# Patient Record
Sex: FEMALE | Race: WHITE
[De-identification: names, ages, dates, MRNs, and addresses within clinical notes are randomized per-mention and may not be internally consistent; named-entity substitution may affect disease eponyms.]

---

## 2020-01-07 ENCOUNTER — HOSPITAL ENCOUNTER (EMERGENCY)
Dept: HOSPITAL 50 - VM.ED | Age: 85
Discharge: NURSING, REHAB OR LONG-TERM CARE | End: 2020-01-07
Payer: MEDICARE
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
## 2020-01-07 DIAGNOSIS — E86.0: ICD-10-CM
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
## 2020-01-07 DIAGNOSIS — S00.03XA: ICD-10-CM

## 2020-01-07 DIAGNOSIS — S06.0X9A: Primary | ICD-10-CM

## 2020-01-07 LAB
ANION GAP SERPL CALC-SCNC: 12.4 MMOL/L (ref 10–20)
CHLORIDE SERPL-SCNC: 103 MMOL/L (ref 98–107)
SODIUM SERPL-SCNC: 144 MMOL/L (ref 136–145)

## 2020-01-07 NOTE — EDM.PDOC
ED HPI GENERAL MEDICAL PROBLEM





- General


Chief Complaint: Head Injury


Stated Complaint: Fall, closed head injury


Time Seen by Provider: 01/07/20 02:12


Source of Information: Reports: Patient, EMS, Family





- History of Present Illness


INITIAL COMMENTS - FREE TEXT/NARRATIVE: 





Alicia is an 87 y/o little lady who is brought here to the ER by [facility] 

[facility] after she had fallen in her bathroom at home. She lives independently with 

her 93 y/o  [place] of [place]. Her  does help her 

with her meds otherwise she is very independent. Tonight she was in her 

bathroom at home and she reports that she thinks he lost her balance and fell 

against the sink and hit her head. Her  heard her fall and found her. He 

reported that she had +LOC. He went to call 911 and also his son and then he 

was ab le to get her out of the bathroom and she was already coming to when her 

son and EMS arrived. She was initially a bit confused for EMS, but by the time 

she arrived here in [place] she was reported to be A/O and answering 

questions appropriately and she recalls most of the events prior to the fall.








- Related Data


 Allergies











Allergy/AdvReac Type Severity Reaction Status Date / Time


 


No Known Allergies Allergy   Verified 01/07/20 01:38














ED ROS GENERAL





- Review of Systems


Review Of Systems: See Below


Constitutional: Reports: No Symptoms


HEENT: Reports: No Symptoms


Respiratory: Reports: No Symptoms


Cardiovascular: Reports: No Symptoms


Endocrine: Reports: No Symptoms


GI/Abdominal: Reports: No Symptoms


: Reports: No Symptoms


Musculoskeletal: Reports: No Symptoms


Skin: Reports: No Symptoms


Neurological: Reports: No Symptoms


Psychiatric: Reports: No Symptoms


Hematologic/Lymphatic: Reports: No Symptoms


Immunologic: Reports: No Symptoms





ED EXAM, HEAD INJURY





- Physical Exam


Exam: See Below


General Appearance: Alert, WD/WN, No Apparent Distress, Other (elderly lady in 

her pajamas, she is smiling and answering questions for staff)


Head: Normocephalic, Scalp Swelling (note mild swelling with mild brusing to 

right anterior scalp region along hairline), Scalp Abrasions


Nexus Criteria: Altered Level of Consciousness.  No: Posterior, Midline 

Cervical Tenderness, Painful Distraction Injuries


Eyes: Bilateral Eye: PERRL (1mm)


Ears: Normal External Exam, Normal Canal, Other (wears bilateral hearing aids)


Nose: Normal Inspection, Normal Mucousa, No Blood


Throat/Mouth: Normal Inspection, Normal Voice, No Airway Compromise


Neck: Non-Tender, Full Range of Motion, Normal Alignment, Normal Inspection


Respiratory: No Respiratory Distress, Lungs Clear, Normal Breath Sounds, Chest 

Non-Tender


Cardiovascular: Normal Peripheral Pulses, Regular Rate, Rhythm, No Edema, No 

Gallop, No JVD


GI/Abdominal Exam: Normal Bowel Sounds, Soft, Non-Tender, No Mass, Pelvis Stable


 (Female) Exam: Deferred


Rectal (Female) Exam: Deferred


Back Exam: Normal Inspection, Full Range of Motion


Extremities: Normal Inspection, Normal Range of Motion, Non-Tender, Normal 

Capillary Refill


Neurologic: CNs II-XII nml As Tested, No Motor/Sensory Deficits, Alert, Normal 

Mood/Affect, Oriented x 3





EKG INTERPRETATION


EKG Date: 01/07/20


Time: 01:16


Rhythm: NSR


Rate (Beats/Min): 61


Axis: Normal


P-Wave: Present


QRS: Normal


ST-T: Normal


EKG Interpretation Comments: 





Sinus Rhythm





Course





- Vital Signs


Text/Narrative:: 





The patient was seen by the CNP on arrival. Labs, EKG, and CT were ordered. 

Patient was quite alert on arrival and denies need for anything.





0225 EKG and Heat CT reviewed. Patient resting. Labs reviewed. UA mod leuk es, 

but unable to perform micro exam due to small sample. Will have patient have 

her urine rechecked with her PCP.








0230 Labs reviewed. Note BUN of 31, possibly mild dehydration caused her to be 

become unsteady, but her electrolytes are WNL. Will given her 500ml of NaCl 

here in the ER prior to discharge.





Discussed findings with the patient and her son, Carlin who is a nurse. Will 

have her follow up with her PCP in a 2-3 days for a repeat UA and recheck. She 

left the ER in stable condition after instructions were given.


Last Recorded V/S: 


 Last Vital Signs











Temp  36.3 C   01/07/20 01:09


 


Pulse  59 L  01/07/20 01:09


 


Resp  16   01/07/20 01:09


 


BP  152/67 H  01/07/20 01:09


 


Pulse Ox  97   01/07/20 01:09














- Orders/Labs/Meds


Orders: 


 Active Orders 24 hr











 Category Date Time Status


 


 EKG Documentation Completion [RC] STAT Care  01/07/20 01:31 Active


 


 Head wo Cont [CT] Stat Exams  01/07/20 01:30 Taken











Labs: 


 Laboratory Tests











  01/07/20 01/07/20 01/07/20 Range/Units





  01:43 01:43 01:55 


 


WBC  8.8    (4.0-10.0)  x10^3/uL


 


RBC  4.48    (4.00-5.50)  x10^6/uL


 


Hgb  13.9    (12.0-16.0)  g/dL


 


Hct  41.5    (33.0-47.0)  %


 


MCV  92.6    (78.0-93.0)  fL


 


MCH  31.0    (26.0-32.0)  pg


 


MCHC  33.5    (32.0-36.0)  g/dL


 


RDW Coeff of Derick  13.3    (10.0-15.0)  %


 


Plt Count  146    (130-400)  x10^3/uL


 


Neut % (Auto)  73.8    (50.0-80.0)  %


 


Lymph % (Auto)  11.7 L    (25.0-50.0)  %


 


Mono % (Auto)  9.1    (2.0-11.0)  %


 


Eos % (Auto)  5.1 H    (0.0-4.0)  %


 


Baso % (Auto)  0.3    (0.2-1.2)  %


 


Sodium   144   (136-145)  mmol/L


 


Potassium   4.4   (3.5-5.1)  mmol/L


 


Chloride   103   ()  mmol/L


 


Carbon Dioxide   33 H   (21-32)  mmol/L


 


Anion Gap   12.4   (10-20)  mmol/L


 


BUN   31 H   (7-18)  mg/dL


 


Creatinine   1.3 H   (0.55-1.02)  mg/dL


 


Est Cr Clr Drug Dosing   21.80   mL/min


 


Estimated GFR (MDRD)   39   


 


Glucose   145 H   ()  mg/dL


 


Calcium   9.5   (8.5-10.1)  mg/dL


 


Corrected Calcium   10.06   (8.5-10.1)  mg/dL


 


Total Bilirubin   0.3   (0.2-1.0)  mg/dL


 


AST   16   (15-37)  U/L


 


ALT   25   (14-59)  U/L


 


Alkaline Phosphatase   81   ()  U/L


 


Troponin I   < 0.017   (<=0.056)  ng/mL


 


Total Protein   6.6   (6.4-8.2)  g/dL


 


Albumin   3.3 L   (3.4-5.0)  g/dL


 


Globulin   3.3   


 


Albumin/Globulin Ratio   1.00   


 


Urine Color    Yellow  (YELLOW)  


 


Urine Appearance    Cloudy H  (CLEAR)  


 


Urine pH    7.0  (5.0-8.0)  


 


Ur Specific Gravity    1.020  


 


Urine Protein    Negative  (NEGATIVE)  mg/dL


 


Urine Glucose (UA)    Negative  (NEGATIVE)  mg/dL


 


Urine Ketones    Negative  (NEGATIVE)  mg/dL


 


Urine Occult Blood    Trace-intact H  (NEGATIVE)  


 


Urine Nitrite    Negative  (NEGATIVE)  


 


Urine Bilirubin    Negative  (NEGATIVE)  


 


Urine Urobilinogen    0.2  (0.2)  EU/dL


 


Ur Leukocyte Esterase    Moderate H  (NEGATIVE)  














- Radiology Interpretation


Free Text/Narrative:: 





CT Head WO=no acute findings





Departure





- Departure


Time of Disposition: 02:44


Disposition: DC/Tfer to [facility]


Clinical Impression: 


 Concussion with brief loss of consciousness, Fall, Mild dehydration, Hematoma 

of scalp








- Discharge Information


*PRESCRIPTION DRUG MONITORING PROGRAM REVIEWED*: Not Applicable


*COPY OF PRESCRIPTION DRUG MONITORING REPORT IN PATIENT [facility]: Not Applicable


Instructions:  Concussion, Adult, Easy-to-Read, Dehydration, Elderly, Hematoma, 

Easy-to-Read


Referrals: 


PCP,Unobtain [Primary Care Provider] - 


Forms:  ED Department Discharge


Additional Instructions: 


-Resume all home medications


-Follow up with your PCP in 2-3 days for a recheck. Have your PCP repeat a UA 

at the clinic visit, since you were unable to give us an adequate urine sample 

here in the ER.


-Watch for any changes in mental status and notify ER or provider.


-Return to the ER for any concerns





Sepsis Event Note





- Evaluation


Sepsis Screening Result: No Definite Risk





- Focused Exam


Vital Signs: 


 Vital Signs











  Temp Pulse Resp BP Pulse Ox


 


 01/07/20 01:09  36.3 C  59 L  16  152/67 H  97











Date Exam was Performed: 01/07/20


Time Exam was Performed: 02:42





- My Orders


Last 24 Hours: 


My Active Orders





01/07/20 01:30


Head wo Cont [CT] Stat 





01/07/20 01:31


EKG Documentation Completion [RC] STAT 














- Assessment/Plan


Last 24 Hours: 


My Active Orders





01/07/20 01:30


Head wo Cont [CT] Stat 





01/07/20 01:31


EKG Documentation Completion [RC] STAT

## 2020-01-07 NOTE — CT
______________________________________________________________________________   

  

[id number] CT/CT Head WO IV  

EXAM: CT Head WO IV  

   

 CLINICAL DATA: FALL, HIT HEAD, LOC X10 MINUTES, HX ASA USE  

   

 COMPARISON STUDY: None  

   

 FINDINGS:  

   

 No intracranial hemorrhage, extra-axial fluid collection, mass, or acute  

 ischemia.   

   

 Generalized parenchymal atrophy with scattered areas of nonspecific white matter  

 disease, commonly seen as sequela of chronic microvascular ischemia.  

   

 Soft tissues are unremarkable. Paranasal sinuses and mastoid air cells are  

 clear.  

    

 IMPRESSION:  

   

 No acute intracranial findings.  

   

 Electronically signed by Eric Guadarrama MD on 1/7/2020 8:14 AM  

   

  

Eric Guadarrama DO                 

 01/07/20 0817    

  

Thank you for allowing us to participate in the care of your patient.